# Patient Record
Sex: FEMALE | Race: BLACK OR AFRICAN AMERICAN | NOT HISPANIC OR LATINO
[De-identification: names, ages, dates, MRNs, and addresses within clinical notes are randomized per-mention and may not be internally consistent; named-entity substitution may affect disease eponyms.]

---

## 2023-02-17 ENCOUNTER — APPOINTMENT (OUTPATIENT)
Dept: BARIATRICS | Facility: CLINIC | Age: 48
End: 2023-02-17
Payer: SELF-PAY

## 2023-02-17 VITALS
OXYGEN SATURATION: 98 % | HEART RATE: 64 BPM | HEIGHT: 64 IN | DIASTOLIC BLOOD PRESSURE: 110 MMHG | SYSTOLIC BLOOD PRESSURE: 163 MMHG | WEIGHT: 218 LBS | BODY MASS INDEX: 37.22 KG/M2

## 2023-02-17 DIAGNOSIS — Z87.39 PERSONAL HISTORY OF OTHER DISEASES OF THE MUSCULOSKELETAL SYSTEM AND CONNECTIVE TISSUE: ICD-10-CM

## 2023-02-17 DIAGNOSIS — Z87.09 PERSONAL HISTORY OF OTHER DISEASES OF THE RESPIRATORY SYSTEM: ICD-10-CM

## 2023-02-17 DIAGNOSIS — E66.01 MORBID (SEVERE) OBESITY DUE TO EXCESS CALORIES: ICD-10-CM

## 2023-02-17 DIAGNOSIS — Z86.2 PERSONAL HISTORY OF DISEASES OF THE BLOOD AND BLOOD-FORMING ORGANS AND CERTAIN DISORDERS INVOLVING THE IMMUNE MECHANISM: ICD-10-CM

## 2023-02-17 PROBLEM — Z00.00 ENCOUNTER FOR PREVENTIVE HEALTH EXAMINATION: Status: ACTIVE | Noted: 2023-02-17

## 2023-02-17 PROCEDURE — 99245 OFF/OP CONSLTJ NEW/EST HI 55: CPT | Mod: NC,95

## 2023-02-17 RX ORDER — OXYCODONE HYDROCHLORIDE 5 MG/1
5 CAPSULE ORAL
Refills: 0 | Status: ACTIVE | COMMUNITY

## 2023-02-17 NOTE — HISTORY OF PRESENT ILLNESS
[de-identified] : Edith Wiley\par \par 2/17/2023 \par \par Edith Wiley, 48 y/o female patient in [facility] followed by [facility] came in today via telehealth for an initial consult. With a BMI of 37. Patient's past surgical history of hypertensive, asthma, anemia, scleroderma. Patient's past medical history of hysterotomy due to severe anemia. Patient works at a [employer] and sits all day. Patient's major issues is pain in her arms, neck and back. 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  Patient recently has started on necrotics and is on decompressive therapy. I have no issue to potentially proceeding with a sleeve gastrectomy however I'm concerned with how I have seen many patients with a higher BMI than Edith who  don't have her orthopedic issues. Will get a second opinion on an excellent spine surgeon Dr. Jose Armando Cody. For now patient to start on Ozempic .25 mg for two weeks and  increase this to .5 mg and I will see her in March. I will have the patient see Dr. Suárez and coordinate an appointment before this time. But the question, will be whether  bariatric surgery should take precedent or spine surgery be first. \par \par -Dr. Tong Montaño

## 2023-02-17 NOTE — ASSESSMENT
[FreeTextEntry1] : Will get a second opinion on an excellent spine surgeon Dr. Jose Armando Cody. For now patient to start on Ozempic .25 mg for two weeks and  increase this to .5 mg and I will see her in March. I will have the patient see Dr. Suárez and coordinate an appointment before this time.

## 2023-02-17 NOTE — REASON FOR VISIT
[Home] : at home, [unfilled] , at the time of the visit. [Medical Office: ([facility])___] : at the medical office located in  [Patient] : the patient [Initial Consult] : an initial consult for

## 2023-03-28 ENCOUNTER — APPOINTMENT (OUTPATIENT)
Dept: ORTHOPEDIC SURGERY | Facility: CLINIC | Age: 48
End: 2023-03-28
Payer: SELF-PAY

## 2023-03-28 VITALS
HEART RATE: 77 BPM | SYSTOLIC BLOOD PRESSURE: 130 MMHG | DIASTOLIC BLOOD PRESSURE: 89 MMHG | HEIGHT: 64 IN | WEIGHT: 218 LBS | BODY MASS INDEX: 37.22 KG/M2 | OXYGEN SATURATION: 98 %

## 2023-03-28 PROCEDURE — 99204 OFFICE O/P NEW MOD 45 MIN: CPT

## 2023-03-28 PROCEDURE — 72083 X-RAY EXAM ENTIRE SPI 4/5 VW: CPT

## 2023-03-30 DIAGNOSIS — M54.50 LOW BACK PAIN, UNSPECIFIED: ICD-10-CM

## 2023-03-30 DIAGNOSIS — M54.2 CERVICALGIA: ICD-10-CM

## 2023-03-30 DIAGNOSIS — G89.29 LOW BACK PAIN, UNSPECIFIED: ICD-10-CM

## 2023-04-01 ENCOUNTER — OUTPATIENT (OUTPATIENT)
Dept: OUTPATIENT SERVICES | Facility: HOSPITAL | Age: 48
LOS: 1 days | End: 2023-04-01
Payer: COMMERCIAL

## 2023-04-01 ENCOUNTER — APPOINTMENT (OUTPATIENT)
Dept: MRI IMAGING | Facility: HOSPITAL | Age: 48
End: 2023-04-01

## 2023-04-01 PROCEDURE — 72141 MRI NECK SPINE W/O DYE: CPT

## 2023-04-01 PROCEDURE — 72148 MRI LUMBAR SPINE W/O DYE: CPT

## 2023-04-01 PROCEDURE — 72148 MRI LUMBAR SPINE W/O DYE: CPT | Mod: 26

## 2023-04-01 PROCEDURE — 72141 MRI NECK SPINE W/O DYE: CPT | Mod: 26

## 2023-04-27 NOTE — DISCUSSION/SUMMARY
[de-identified] : Diagnosis: Chronic low back pain and neck pain.\par \par The patient had MRIs of her Cervical and Lumbar spine taken approximately 1 week ago in [facility]. She does not have the images available today. She will try and obtain links to the imaging so that I can review them, and we will have a follow up either in person or telehealth visit to review.

## 2023-04-27 NOTE — ADDENDUM
[FreeTextEntry1] : Documented by Oriana Abdullahi acting as a scribe for Dr. Juvenal Cody on 03/29/2023.

## 2023-04-27 NOTE — END OF VISIT
[FreeTextEntry3] : All medical record entries made by the Scribe were at my, Dr. Juvenal Cody, direction and personally dictated by me on 03/28/2023. I have reviewed the chart and agree that the record accurately reflects my personal performance of the history, physical exam, assessment and plan. I have also personally directed, reviewed, and agreed with the chart.

## 2023-04-27 NOTE — PHYSICAL EXAM
[de-identified] : Physical Exam:\par \par General: patient is well developed, well nourished, in no acute\par distress, alert and oriented x 3.\par \par Mood and affect: normal\par \par Respiratory: no respiratory distress noted\par \par Skin: no scars over spine, skin intact, no erythema, increased warmth\par \par Alignment: The spine is well compensated in the coronal and sagittal plane.\par \par Gait: The patient is able to toe walk and heel walk without difficulty.\par \par Palpation: no tenderness to palpation midline along spine or paraspinal region\par \par Range of motion: Cervical and Lumbar spine ROM is full\par \par Neurologic Exam:\par Motor: Manual Muscle testing in the upper and lower extremities is 5 out of 5 in all muscle groups. There is no evidence of\par muscular atrophy in the upper extremities. Sensory: Sensation to light touch is grossly intact in the upper and lower\par extremities\par \par Reflexes: DTR are present and symmetric throughout, negative hogan bilaterally, negative inverted radial reflex bilaterally,\par no clonus, plantar responses are flexor\par \par Special tests: Spurlings sign absent. Lhermitte's sign is absent. Straight Leg Raise Negative, Cross Straight Leg Raise\par Negative, EZE Test Negative\par \par Hip Exam: Full painless ROM of bilateral hips\par \par Vascular: Examination of the peripheral vascular system demonstrates no evidence of congestion or edema. no\par lymphedema bilateral lower extremities, pulses are present and symmetric in both lower extremities. [de-identified] : XR Full Spine AP/Lateral with Lumbar flexion/extension 03/28/2023 [my read]: No significant disc abnormality in the cervical, thoracic, or lumbar spine grossly on xray. There is no instability on flexion/extension. There is no fracture seen. There is no significant scoliosis. There is no significant hip disease seen. Her alignment in the coronal and sagittal planes are appropriate.

## 2023-04-27 NOTE — HISTORY OF PRESENT ILLNESS
[de-identified] : Initial visit 03/28/2023: Ms. Wiley is here for spinal consultation. She has been experiencing significant on and off lower back pain for quite some time. When she has flares, which happen frequently throughout the year, she requires daily medications including codeine, gabapentin, muscle relaxants, and Tylenol. She was  seen by Dr. Montaño recently, who is considering a bariatric procedure. She denies any pain that radiates down her lower extremities. The pain radiates into her right buttock, right groin, and right anterior thigh. When these flares occur, the pain is quite debilitating. She also complains of chronic neck discomfort. She had MRIs done in her home country of [place] [facility]. She denies any bowel or bladder symptoms.